# Patient Record
Sex: FEMALE | Race: OTHER
[De-identification: names, ages, dates, MRNs, and addresses within clinical notes are randomized per-mention and may not be internally consistent; named-entity substitution may affect disease eponyms.]

---

## 2023-12-20 ENCOUNTER — APPOINTMENT (OUTPATIENT)
Dept: ORTHOPEDIC SURGERY | Facility: CLINIC | Age: 53
End: 2023-12-20

## 2024-01-29 ENCOUNTER — APPOINTMENT (OUTPATIENT)
Dept: ORTHOPEDIC SURGERY | Facility: CLINIC | Age: 54
End: 2024-01-29
Payer: COMMERCIAL

## 2024-01-29 VITALS — HEIGHT: 66 IN | BODY MASS INDEX: 26.52 KG/M2 | WEIGHT: 165 LBS

## 2024-01-29 DIAGNOSIS — M25.562 PAIN IN LEFT KNEE: ICD-10-CM

## 2024-01-29 DIAGNOSIS — M22.2X1 PATELLOFEMORAL DISORDERS, RIGHT KNEE: ICD-10-CM

## 2024-01-29 DIAGNOSIS — M25.561 PAIN IN RIGHT KNEE: ICD-10-CM

## 2024-01-29 DIAGNOSIS — M22.2X2 PATELLOFEMORAL DISORDERS, RIGHT KNEE: ICD-10-CM

## 2024-01-29 PROCEDURE — 73564 X-RAY EXAM KNEE 4 OR MORE: CPT | Mod: 50

## 2024-01-29 PROCEDURE — 99204 OFFICE O/P NEW MOD 45 MIN: CPT

## 2024-01-30 PROBLEM — M22.2X1 PATELLOFEMORAL PAIN SYNDROME OF BOTH KNEES: Status: ACTIVE | Noted: 2024-01-29

## 2024-01-30 NOTE — IMAGING
[Bilateral] : knee bilaterally [All Views] : anteroposterior, lateral, skyline, and anteroposterior standing [Mild tricompartmental OA lateral narrowing] : Mild tricompartmental OA lateral narrowing [Moderate patellofemoral OA] : Moderate patellofemoral OA [de-identified] : The patient is a well appearing 53 year old F of their stated age.  Patient ambulates with a normal gait.  Negative straight leg raise bilateral   Effected Knee: RIGHT  ROM:  0-145 degrees  Lachman: Negative  Pivot Shift: Negative Anterior Drawer: Negative Posterior Drawer / Sag:Negative  Varus Stress 0 degrees: Stable  Varus Stress 30 degrees: Stable  Valgus Stress 0 degrees: Stable Valgus Stress 30 degrees: Stable  Medial Suzanne: Negative Lateral Daniel: Negative  Patella Glide: 2+  Patella Apprehension: Negative  Patella Grind: Negative    Palpation:  Medial Joint Line: TENDER  Lateral Joint Line: TENDER  Medial Collateral Ligament: Nontender  Lateral Collateral Ligament/PLC: Nontender  Distal Femur: Nontender  Proximal Tibia: Nontender  Tibial Tubercle: Nontender  Distal Pole Patella: Nontender  Quadriceps Tendon: Nontender &  Intact  Patella Tendon: Nontender &  Intact  Medial Distal Hamstring/PES: TENDER  Lateral Distal Hamstring: Nontender & Stable  Iliotibial Band: TENDER & TIGHT  Medial Patellofemoral Ligament: Nontender  Adductor: Nontender  Proximal GSC-Plantaris: Nontender  Calf: Supple & Nontender    Inspection:  Deformity: No  Erythema: No  Ecchymosis: No  Abrasions: No  Effusion: No  Prepatella Bursitis: No  Neurologic Exam:  Sensation L4-S1: Grossly Intact  Motor Exam:  Quadriceps: 5 out of 5  Hamstrings: 5 out of 5  EHL: 5 out of 5  FHL: 5 out of 5  TA: 5 out of 5  GS: 5 out of 5  Circulatory/Pulses:  Dorsalis Pedis: 2+  Posterior Tibialis: 2+  Additional Pertinent Findings: None  >>>>>>>>>>>>>>>>>>>>>>>>>>> Effected Knee: LEFT ROM:  0-145 degrees  Lachman: Negative  Pivot Shift: Negative  Anterior Drawer: Negative  Posterior Drawer / Sag:Negative  Varus Stress 0 degrees: Stable  Varus Stress 30 degrees: Stable  Valgus Stress 0 degrees: Stable  Valgus Stress 30 degrees: Stable  Medial Suzanne: Negative  Lateral Suzanne: Negative  Patella Glide: 2+  Patella Apprehension: Negative  Patella Grind: Negative    Palpation:  Medial Joint Line: TENDER  Lateral Joint Line: TENDER  Medial Collateral Ligament: Nontender  Lateral Collateral Ligament/PLC: Nontender  Distal Femur: Nontender  Proximal Tibia: Nontender  Tibial Tubercle: Nontender  Distal Pole Patella: Nontender  Quadriceps Tendon: Nontender &  Intact  Patella Tendon: Nontender &  Intact  Medial Distal Hamstring/PES: TENDER  Lateral Distal Hamstring: Nontender & Stable  Iliotibial Band: TENDER & TIGHT  Medial Patellofemoral Ligament: Nontender  Adductor: Nontender  Proximal GSC-Plantaris: Nontender  Calf: Supple & Nontender   Inspection: Deformity: No  Erythema: No  Ecchymosis: No  Abrasions: No  Effusion: No  Prepatella Bursitis: No  Neurologic Exam:  Sensation L-S1: Grossly Intact   Motor Exam:  Quadriceps: 5 out of 5  Hamstrings: 5 out of 5  EHL: 5 out of 5  FHL: 5 out of 5  TA: 5 out of 5  GS: 5 out of 5  Circulatory/Pulses:  Dorsalis Pedis: 2+ Posterior Tibialis: 2+  Additional Pertinent Findings: None    Assessment: The patient is a 53 year old F with bilateral knee pain and radiographic and physical exam findings consistent with bilateral patellofemoral syndrome. The patients condition is acute  Documents/Results Reviewed Today: X-ray bilateral knees Tests/Studies Independently Interpreted Today: X-Ray bilateral knees reveals moderate patellofemoral joint compartment narrowing, mild lateral joint compartment narrowing.  Pertinent findings include: BILATERAL: medial joint line tenderness, lateral joint line tenderness, medial distal hamstring tenderness, ITB tenderness and tightness.   Confounding medical conditions/concerns: none   Plan: Patient will start physical therapy, HEP, and stretching. Advised patient to obtain Reparel knee sleeves and full length shoe inserts. Take OTC antiinflammatories as needed - use as directed. Modify activity as discussed. If pain persists or worsens, consider a corticosteroid injection at next visit or possibly ordering VISCO injections.  Tests Ordered: None  Prescription Medications Ordered: Discussed appropriate use of OTC anti-inflammatories and topical analgesics (including but not limited to Aleve, Advil, Tylenol, Motrin, Ibuprofen, Voltaren gel, etc.)  Braces/DME Ordered: None Activity/Work/Sports Status: Activity modifications Additional Instructions: Reparel knee sleeves and full length shoe inserts  Follow-Up: 6 weeks   The patient's current medication management of their orthopedic diagnosis was reviewed today: (1) We discussed a comprehensive treatment plan that included possible pharmaceutical management involving the use of prescription strength medications including but not limited to options such as oral Naprosyn 500mg BID, once daily Meloxicam 15 mg, or 500-650 mg Tylenol versus over the counter oral medications and topical prescription NSAID Pennsaid vs over the counter Voltaren gel.  Based on our extensive discussion, the patient declined prescription medication and will use over the counter Advil, Alleve, Voltaren Gel or Tylenol as directed. (2) There is a moderate risk of morbidity with further treatment, especially from use of prescription strength medications and possible side effects of these medications which include upset stomach with oral medications, skin reactions to topical medications and cardiac/renal issues with long term use. (3) I recommended that the patient follow-up with their medical physician to discuss any significant specific potential issues with long term medication use such as interactions with current medications or with exacerbation of underlying medical comorbidities. (4) The benefits and risks associated with use of injectable, oral or topical, prescription and over the counter anti-inflammatory medications were discussed with the patient. The patient voiced understanding of the risks including but not limited to bleeding, stroke, kidney dysfunction, heart disease, and were referred to the black box warning label for further information.  I, Glendy Tinsley, attest that this documentation has been prepared under the direction and in the presence of Provider Dr. Edison Bee.  The documentation recorded by the scribe accurately reflects the services IDr. Edison, personally performed and the decisions made by me.

## 2024-01-30 NOTE — HISTORY OF PRESENT ILLNESS
[de-identified] : The patient is a 53 year  old right hand dominant female who presents today complaining of bilateral knee pain   Date of Injury/Onset: chronic 2019 Pain:    At Rest: 0/10  With Activity:  5/10  Mechanism of injury: gradual onset, no ANTON This is NOT a Work Related Injury being treated under Worker's Compensation. This is NOT an athletic injury occurring associated with an interscholastic or organized sports team. Quality of symptoms: dull aching Improves with: rest Worse with: down stairs, lunging, kneeling Prior treatment: none Prior Imaging: none Out of work/sport: currently working School/Sport/Position/Occupation: psychotherapist  Additional Information: None

## 2024-03-11 ENCOUNTER — APPOINTMENT (OUTPATIENT)
Dept: ORTHOPEDIC SURGERY | Facility: CLINIC | Age: 54
End: 2024-03-11

## 2025-09-01 ENCOUNTER — NON-APPOINTMENT (OUTPATIENT)
Age: 55
End: 2025-09-01